# Patient Record
Sex: FEMALE | Race: WHITE | Employment: OTHER | URBAN - METROPOLITAN AREA
[De-identification: names, ages, dates, MRNs, and addresses within clinical notes are randomized per-mention and may not be internally consistent; named-entity substitution may affect disease eponyms.]

---

## 2017-01-01 ENCOUNTER — TELEPHONE (OUTPATIENT)
Dept: FAMILY MEDICINE CLINIC | Facility: CLINIC | Age: 82
End: 2017-01-01

## 2017-01-01 ENCOUNTER — HOSPITAL ENCOUNTER (EMERGENCY)
Facility: HOSPITAL | Age: 82
Discharge: HOME | End: 2017-01-01
Attending: EMERGENCY MEDICINE
Payer: MEDICARE

## 2017-01-01 ENCOUNTER — ASST LIVING (OUTPATIENT)
Dept: FAMILY MEDICINE CLINIC | Facility: CLINIC | Age: 82
End: 2017-01-01

## 2017-01-01 ENCOUNTER — MED REC SCAN ONLY (OUTPATIENT)
Dept: FAMILY MEDICINE CLINIC | Facility: CLINIC | Age: 82
End: 2017-01-01

## 2017-01-01 VITALS
DIASTOLIC BLOOD PRESSURE: 82 MMHG | HEART RATE: 54 BPM | OXYGEN SATURATION: 94 % | RESPIRATION RATE: 18 BRPM | SYSTOLIC BLOOD PRESSURE: 118 MMHG | TEMPERATURE: 97 F

## 2017-01-01 VITALS
DIASTOLIC BLOOD PRESSURE: 83 MMHG | OXYGEN SATURATION: 96 % | SYSTOLIC BLOOD PRESSURE: 155 MMHG | HEART RATE: 57 BPM | TEMPERATURE: 97 F | RESPIRATION RATE: 15 BRPM

## 2017-01-01 VITALS
TEMPERATURE: 98 F | DIASTOLIC BLOOD PRESSURE: 62 MMHG | SYSTOLIC BLOOD PRESSURE: 130 MMHG | WEIGHT: 160 LBS | HEART RATE: 66 BPM | OXYGEN SATURATION: 96 % | BODY MASS INDEX: 29 KG/M2

## 2017-01-01 DIAGNOSIS — D50.0 IRON DEFICIENCY ANEMIA DUE TO CHRONIC BLOOD LOSS: Primary | ICD-10-CM

## 2017-01-01 DIAGNOSIS — G30.9 ALZHEIMER'S DEMENTIA WITHOUT BEHAVIORAL DISTURBANCE, UNSPECIFIED TIMING OF DEMENTIA ONSET (HCC): ICD-10-CM

## 2017-01-01 DIAGNOSIS — R40.0 HAS DAYTIME DROWSINESS: ICD-10-CM

## 2017-01-01 DIAGNOSIS — Z86.73 HISTORY OF STROKE: ICD-10-CM

## 2017-01-01 DIAGNOSIS — E53.8 B12 DEFICIENCY: Primary | ICD-10-CM

## 2017-01-01 DIAGNOSIS — F02.80 ALZHEIMER'S DEMENTIA WITHOUT BEHAVIORAL DISTURBANCE, UNSPECIFIED TIMING OF DEMENTIA ONSET (HCC): ICD-10-CM

## 2017-01-01 DIAGNOSIS — H61.23 IMPACTED CERUMEN, BILATERAL: ICD-10-CM

## 2017-01-01 DIAGNOSIS — I10 ESSENTIAL HYPERTENSION: ICD-10-CM

## 2017-01-01 DIAGNOSIS — I48.20 CHRONIC ATRIAL FIBRILLATION (HCC): ICD-10-CM

## 2017-01-01 DIAGNOSIS — K92.2 GASTROINTESTINAL HEMORRHAGE, UNSPECIFIED GASTROINTESTINAL HEMORRHAGE TYPE: Primary | ICD-10-CM

## 2017-01-01 DIAGNOSIS — H61.23 IMPACTED CERUMEN, BILATERAL: Primary | ICD-10-CM

## 2017-01-01 LAB
ALBUMIN SERPL-MCNC: 2.9 G/DL (ref 3.5–4.8)
ALP LIVER SERPL-CCNC: 58 U/L (ref 55–142)
ALT SERPL-CCNC: 14 U/L (ref 14–54)
APTT PPP: 38.6 SECONDS (ref 25–34)
AST SERPL-CCNC: 11 U/L (ref 15–41)
ATRIAL RATE: 35 BPM
BASOPHILS # BLD AUTO: 0.06 X10(3) UL (ref 0–0.1)
BASOPHILS NFR BLD AUTO: 0.6 %
BILIRUB SERPL-MCNC: 0.2 MG/DL (ref 0.1–2)
BUN BLD-MCNC: 41 MG/DL (ref 8–20)
CALCIUM BLD-MCNC: 8.6 MG/DL (ref 8.3–10.3)
CHLORIDE: 115 MMOL/L (ref 101–111)
CO2: 21 MMOL/L (ref 22–32)
CREAT BLD-MCNC: 1.42 MG/DL (ref 0.55–1.02)
EOSINOPHIL # BLD AUTO: 0.48 X10(3) UL (ref 0–0.3)
EOSINOPHIL NFR BLD AUTO: 5 %
ERYTHROCYTE [DISTWIDTH] IN BLOOD BY AUTOMATED COUNT: 13.8 % (ref 11.5–16)
GLUCOSE BLD-MCNC: 149 MG/DL (ref 70–99)
HCT VFR BLD AUTO: 32.6 % (ref 34–50)
HGB BLD-MCNC: 10.3 G/DL (ref 12–16)
IMMATURE GRANULOCYTE COUNT: 0.03 X10(3) UL (ref 0–1)
IMMATURE GRANULOCYTE RATIO %: 0.3 %
INR BLD: 3.13 (ref 0.89–1.11)
LYMPHOCYTES # BLD AUTO: 3.93 X10(3) UL (ref 0.9–4)
LYMPHOCYTES NFR BLD AUTO: 40.9 %
M PROTEIN MFR SERPL ELPH: 6.8 G/DL (ref 6.1–8.3)
MCH RBC QN AUTO: 31.9 PG (ref 27–33.2)
MCHC RBC AUTO-ENTMCNC: 31.6 G/DL (ref 31–37)
MCV RBC AUTO: 100.9 FL (ref 81–100)
MONOCYTES # BLD AUTO: 0.66 X10(3) UL (ref 0.1–0.6)
MONOCYTES NFR BLD AUTO: 6.9 %
NEUTROPHIL ABS PRELIM: 4.46 X10 (3) UL (ref 1.3–6.7)
NEUTROPHILS # BLD AUTO: 4.46 X10(3) UL (ref 1.3–6.7)
NEUTROPHILS NFR BLD AUTO: 46.3 %
PLATELET # BLD AUTO: 235 10(3)UL (ref 150–450)
POTASSIUM SERPL-SCNC: 4.4 MMOL/L (ref 3.6–5.1)
PSA SERPL DL<=0.01 NG/ML-MCNC: 32.9 SECONDS (ref 12–14.3)
Q-T INTERVAL: 388 MS
QRS DURATION: 76 MS
QTC CALCULATION (BEZET): 371 MS
R AXIS: 17 DEGREES
RBC # BLD AUTO: 3.23 X10(6)UL (ref 3.8–5.1)
RED CELL DISTRIBUTION WIDTH-SD: 50.4 FL (ref 35.1–46.3)
SODIUM SERPL-SCNC: 146 MMOL/L (ref 136–144)
T AXIS: 0 DEGREES
TROPONIN: <0.046 NG/ML (ref ?–0.05)
VENTRICULAR RATE: 55 BPM
WBC # BLD AUTO: 9.6 X10(3) UL (ref 4–13)

## 2017-01-01 PROCEDURE — 99285 EMERGENCY DEPT VISIT HI MDM: CPT

## 2017-01-01 PROCEDURE — 85610 PROTHROMBIN TIME: CPT | Performed by: EMERGENCY MEDICINE

## 2017-01-01 PROCEDURE — 85730 THROMBOPLASTIN TIME PARTIAL: CPT | Performed by: EMERGENCY MEDICINE

## 2017-01-01 PROCEDURE — 93005 ELECTROCARDIOGRAM TRACING: CPT

## 2017-01-01 PROCEDURE — 36415 COLL VENOUS BLD VENIPUNCTURE: CPT

## 2017-01-01 PROCEDURE — 93010 ELECTROCARDIOGRAM REPORT: CPT

## 2017-01-01 PROCEDURE — 80053 COMPREHEN METABOLIC PANEL: CPT | Performed by: EMERGENCY MEDICINE

## 2017-01-01 PROCEDURE — 69210 REMOVE IMPACTED EAR WAX UNI: CPT | Performed by: FAMILY MEDICINE

## 2017-01-01 PROCEDURE — 85025 COMPLETE CBC W/AUTO DIFF WBC: CPT | Performed by: EMERGENCY MEDICINE

## 2017-01-01 PROCEDURE — 84484 ASSAY OF TROPONIN QUANT: CPT | Performed by: EMERGENCY MEDICINE

## 2017-01-01 RX ORDER — METHYLPHENIDATE HYDROCHLORIDE 5 MG/1
5 TABLET ORAL
Qty: 30 TABLET | Refills: 0 | Status: SHIPPED | OUTPATIENT
Start: 2017-01-01 | End: 2017-01-01

## 2017-01-01 RX ORDER — DOCUSATE SODIUM 100 MG/1
100 CAPSULE, LIQUID FILLED ORAL 2 TIMES DAILY
COMMUNITY

## 2017-01-01 RX ORDER — METHYLPHENIDATE HYDROCHLORIDE 10 MG/1
10 TABLET ORAL
COMMUNITY
End: 2017-01-01

## 2017-01-01 RX ORDER — CYANOCOBALAMIN 1000 UG/ML
1000 INJECTION INTRAMUSCULAR; SUBCUTANEOUS
Qty: 10 ML | Refills: 1 | Status: SHIPPED | OUTPATIENT
Start: 2017-01-01

## 2017-01-01 RX ORDER — METHYLPHENIDATE HYDROCHLORIDE 10 MG/1
10 TABLET ORAL
Qty: 30 TABLET | Refills: 0 | Status: SHIPPED | OUTPATIENT
Start: 2017-01-01 | End: 2017-01-01

## 2017-01-01 RX ORDER — METHYLPHENIDATE HYDROCHLORIDE 10 MG/1
10 TABLET ORAL DAILY
Qty: 30 TABLET | Refills: 0 | OUTPATIENT
Start: 2017-01-01

## 2017-01-01 RX ORDER — METHYLPHENIDATE HYDROCHLORIDE 10 MG/1
10 TABLET ORAL
Refills: 0 | OUTPATIENT
Start: 2017-01-01

## 2017-01-01 RX ORDER — METHYLPHENIDATE HYDROCHLORIDE 10 MG/1
10 TABLET ORAL DAILY
Qty: 30 TABLET | Refills: 0 | Status: SHIPPED | OUTPATIENT
Start: 2017-01-01 | End: 2018-08-23

## 2017-01-07 NOTE — TELEPHONE ENCOUNTER
Rx has been approved and signed by Dr. Chen Alva.  It is up at the [facility] to pick -up on Monday 1/9/17

## 2017-01-18 NOTE — PROGRESS NOTES
/68 mmHg  Pulse 54  Temp(Src) 97.2 °F (36.2 °C) (Oral)  SpO2 98%              Patient presents with:  Medication Follow-Up: Lory LUNA;    Naldo Franklin is a 80year old female. Patient is an assisted-living resident, she has his Rfl: 0   Warfarin Sodium (COUMADIN) 2.5 MG Oral Tab TAKE 1 TABLET BY MOUTH ONCE DAILY Disp: 30 tablet Rfl: 0   Losartan Potassium-HCTZ (HYZAAR) 100-25 MG Oral Tab Take  by mouth. Disp:  Rfl:    LORazepam (ATIVAN) 0.5 MG Oral Tab Take  by mouth.  Disp:  Rfl: otherwise  SKIN: denies any unusual skin lesions or rashes  RESPIRATORY: denies shortness of breath with exertion  CARDIOVASCULAR: denies chest pain on exertion  GI: denies abdominal pain and denies heartburn  NEURO: denies headaches  EXAM:   /68 mmH

## 2017-02-15 NOTE — PROGRESS NOTES
/68 mmHg  Pulse 71  Temp(Src) 97.2 °F (36.2 °C) (Oral)  SpO2 91%              Patient presents with: Follow - Up: Jolene-- referral for pallative care       HPI;    Judith Don is a 80year old female.   I was called by the patient's daughter 5 MG Oral Tab TAKE 1 TABLET BY MOUTH TWICE DAILY Disp: 30 tablet Rfl: 0   Nystatin (NYSTOP) 772867 UNIT/GM External Powder APPLY TOPICALLY THREE TIMES DAILY Disp: 60 g Rfl: 0   furosemide (LASIX) 20 MG Oral Tab TAKE 1 TABLET BY MOUTH EVERY DAY Disp: 90 tab hypertension    • Atrial fibrillation (HCC)    • Anticoagulated on Coumadin       Social History:    Smoking Status: Former Smoker                   Packs/Day: 0.00  Years:         Alcohol Use: No                 REVIEW OF SYSTEMS:   GENERAL HEALTH: feels her          The patient indicates understanding of these issues and agrees to the plan.   Imaging & Consults:  None  Meds & Refills for this Visit:  No prescriptions requested or ordered in this encounter  No orders of the defined types were placed in this

## 2017-07-11 NOTE — PROGRESS NOTES
/82   Pulse 54   Temp (!) 97.3 °F (36.3 °C) (Oral)   Resp 18   SpO2 94%               Patient presents with: Follow - Up: Rick-- 5 month f/u       HPI;    Devaughn Helms is a 80year old female. Patient came in for follow-up, she was last seen (LASIX) 20 MG Oral Tab TAKE 1 TABLET BY MOUTH EVERY DAY Disp: 90 tablet Rfl: 0   Warfarin Sodium (COUMADIN) 2.5 MG Oral Tab TAKE 1 TABLET BY MOUTH ONCE DAILY Disp: 30 tablet Rfl: 0   Losartan Potassium-HCTZ (HYZAAR) 100-25 MG Oral Tab Take  by mouth.  Disp: rashes  RESPIRATORY: denies shortness of breath with exertion  CARDIOVASCULAR: denies chest pain on exertion  GI: denies abdominal pain and denies heartburn  NEURO: denies headaches  EXAM:   /82   Pulse 54   Temp (!) 97.3 °F (36.3 °C) (Oral)   Resp 1 Comp Metabolic Panel      TSH and Free T4      Vitamin B12 [E]        [address] [facility]  [id number] [facility] [address]        Annual Depression Screen due on [birth year]19[birth year]26  Adult Pneumonia Vaccine(2 of 2 - PPSV23) due on 11/1

## 2017-07-12 NOTE — TELEPHONE ENCOUNTER
It was already ordered in the chart yesterday  If they cannot find the orders please give them a verbal order for Ritalin 10 mg p.o. daily in the morning

## 2017-07-12 NOTE — TELEPHONE ENCOUNTER
Malena Reich from [facility] called saying Pt's daughter informed them Dr gave Pt a script for Ritalin at yesterday's appointment. Orders are needed .

## 2017-07-15 NOTE — TELEPHONE ENCOUNTER
Pharmacy said they received Rx for methylphenidate with no Qty listed, I offered qty based on Rx in chart she said she needs verbal directly from

## 2017-07-17 NOTE — TELEPHONE ENCOUNTER
Per Cesar Mac, they need to speak with physician directly regarding prescription for  Methylphenidate as this is a schedule 2 drug, please call Hemanth Boggs, thank you

## 2017-07-19 NOTE — PROGRESS NOTES
There were no vitals taken for this visit. Patient presents with:  Cerumen Impaction: Patient used hydrogen peroxide eardrops for 1 week       HPI;    Ingris Fuentes is a 80year old female.   Patient was seen last week and was advised to use hy (ATIVAN) 0.5 MG Oral Tab Take  by mouth. Disp:  Rfl:    omeprazole (PRILOSEC) 20 MG Oral Capsule Delayed Release Take  by mouth. Disp:  Rfl:    acetaminophen (TYLENOL) 325 MG Oral Tab Take  by mouth.  Disp:  Rfl:    Potassium Chloride ER (MICRO-K) 10 MEQ Or distress  SKIN: no rashes,no suspicious lesions  HEENT: atraumatic, normocephalic,  Bilateral impacted cerumen  Ears cleaned-tympanic membranes intact  NECK: supple,no adenopathy,no bruits  LUNGS: clear to auscultation  CARDIO: RRR without murmur  GI: good

## 2017-08-11 NOTE — ED NOTES
Pt was discharged home with daughter after lengthy discussion with Dr. Zack Tan. Pt was instructed to stop Warfarin until further orders from PCP. Bhumika DEAN at [facility] was informed of pt's pending return by vehicle. accompanied by daughter.  Trevin Becker

## 2017-08-11 NOTE — ED NOTES
Pt's daughter is here and is strongly against having anything invasive done to pt. Daughter reports that pt is \"not even aware of why she is here\" and pt is also denying any pain at this time.  Pt has been assisted up [facility] for 2nd time since arrival and pt

## 2017-08-11 NOTE — ED INITIAL ASSESSMENT (HPI)
Brought in from [facility] for rectal bleeding. Pt reports being unaware of passing blood in stool, but admits to having abdominal cramping pains.

## 2017-08-11 NOTE — ED PROVIDER NOTES
Patient Seen in: [facility] Emergency Department    History   Patient presents with:  GI Bleeding (gastrointestinal)    Stated Complaint: bleeding    HPI    Patient is a nursing home resident with dementia, DNR status, who passed bright red blood pe daily   omeprazole (PRILOSEC) 20 MG Oral Capsule Delayed Release,  Take  by mouth. acetaminophen (TYLENOL) 325 MG Oral Tab,  Take  by mouth. Potassium Chloride ER (MICRO-K) 10 MEQ Oral Cap CR,  Take  by mouth.    Losartan Potassium-HCTZ (HYZAAR) 100-25 src: Temporal  SpO2: 97 %  O2 Device: None (Room air)    Current:/83   Pulse 57   Temp (!) 97.1 °F (36.2 °C) (Temporal)   Resp 15   SpO2 96%         Physical Exam  Alert and cooperative, appears to be resting comfortably. Patient is confused.   Afebr ------                     CBC W/ DIFFERENTIAL[[id number]]          Abnormal            Final result                 Please view results for these tests on the individual orders.    RAINBOW DRAW BLUE   RAINBOW DRAW GOLD   RAINBOW DRAW LAVENDER   RAINBOW DRAW

## 2017-08-16 NOTE — PROGRESS NOTES
/62 (BP Location: Left arm, Patient Position: Sitting, Cuff Size: large)   Pulse 66   Temp 97.8 °F (36.6 °C) (Oral)   Wt 160 lb   SpO2 96%   BMI 29.03 kg/m²               Patient presents with:  Anemia: Secondary to lower GI bleed, patient seen in th (LASIX) 20 MG Oral Tab TAKE 1 TABLET BY MOUTH EVERY DAY Disp: 90 tablet Rfl: 0   Warfarin Sodium (COUMADIN) 2.5 MG Oral Tab TAKE 1 TABLET BY MOUTH ONCE DAILY (Patient taking differently: 1/2 tab daily) Disp: 30 tablet Rfl: 0   Losartan Potassium-HCTZ (HYZA otherwise  SKIN: denies any unusual skin lesions or rashes  RESPIRATORY: denies shortness of breath with exertion  CARDIOVASCULAR: denies chest pain on exertion  GI: denies abdominal pain and denies heartburn  NEURO: denies headaches  EXAM:   /62 (BP

## 2017-08-17 NOTE — TELEPHONE ENCOUNTER
Discussed with the patient's daughter  Prescription faxed to [facility]  See the copy of the prescription in the letters tab

## 2017-08-17 NOTE — TELEPHONE ENCOUNTER
Pt's daughter called asking for orders for Hospice. Needs to be faxed to [facility]. Please let daughter now when done. Daughter said the hospice co is waiting for it.

## 2017-08-18 NOTE — TELEPHONE ENCOUNTER
Pending Prescriptions Disp Refills    methylphenidate 10 MG Oral Tab  0     Sig: Take 1 tablet (10 mg total) by mouth 3 (three) times daily before meals.        last home visit 08/15/2017, please see pended medication thank you

## 2017-08-23 NOTE — TELEPHONE ENCOUNTER
Yoni Leach from [facility] is calling back asking about methylphenidate, requesting that Dr write paper prescription and [facility] will pick it up.  Also, patient has been having diarrhea and family is requesting that docusate sodium 100 mg be changed to PRN

## 2017-08-23 NOTE — TELEPHONE ENCOUNTER
Shantanu Cheng called back to say the family said nevermind about changing the docusate prescription and they'd like to leave it as is

## 2017-08-24 NOTE — TELEPHONE ENCOUNTER
Spoke with Nurse Gabino Fabian at [facility] regarding daily dosing for patients Ritalin and she stated she needed a corrected label as the one they received states three times daily with meals, I called patients pharmacy and advised them of prescription whitaker

## 2017-08-24 NOTE — TELEPHONE ENCOUNTER
Nurse from [address] [place] [address] called to verify if patient now taking three times daily as she had been on once daily?  Please advise thank you

## 2017-09-25 NOTE — TELEPHONE ENCOUNTER
Nurse from [address] [place] [address] called requesting to speak with Dr to give Condition Update on patient.   Phone [phone] ext [phone]

## 2017-10-02 NOTE — TELEPHONE ENCOUNTER
Cydney Mcgowan from [facility] called to inform  that Pt passed away this morning at 11:47am.    She will also need Dr to sign Pt's hospice orders.  (will refax)

## 2017-10-04 ENCOUNTER — TELEPHONE (OUTPATIENT)
Dept: FAMILY MEDICINE CLINIC | Facility: CLINIC | Age: 82
End: 2017-10-04
